# Patient Record
(demographics unavailable — no encounter records)

---

## 2025-03-03 NOTE — CONSULT LETTER
[Dear  ___] : Dear  [unfilled], [Consult Letter:] : I had the pleasure of evaluating your patient, [unfilled]. [Please see my note below.] : Please see my note below. [Consult Closing:] : Thank you very much for allowing me to participate in the care of this patient.  If you have any questions, please do not hesitate to contact me. [Sincerely,] : Sincerely, [FreeTextEntry3] : Jaime [DrCitlaly  ___] : Dr. MARINA [DrCitlaly ___] : Dr. MARINA

## 2025-03-03 NOTE — REVIEW OF SYSTEMS
[Nocturia] : nocturia [Negative] : Endocrine [FreeTextEntry2] : lost weight during the stomach virus.  Weight is stable currently.  [FreeTextEntry3] : diabetic retinopathy and macular edema (sees Dr. Miguel Molina) [FreeTextEntry8] : x 1-2 [FreeTextEntry9] : joint and muscle pain at times "just old age kind of stuff" [de-identified] : benign positional vertigo "it comes and goes" [de-identified] : anemia

## 2025-03-03 NOTE — ASSESSMENT
[FreeTextEntry1] : ! ! Mr. 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 He was admitted to [facility] for a small bowel obstruction which resolved with conservative care. This never recurred. Norm seems to be doing well. His recent BUN/creatinine trend is as follows: 32/1.53 (11/07/2019), 27/1.43 (06/25/2020), 23/1.37 (03/02/2021), 25/1.42 (08/31/2021), 25/1.60 (12/08/2021), 29/1.57 (11/30/2023) --> 26/1.62 (04/11/2024), 31/1.76 (04/17/2024), 27/1.79 (04/25/2024) -->24/1.65 (2/11/2025). The BK virus was not detected.  The tacrolimus level is 5.5 mcg/L. The MMF has been changed to Myfortic.   IMPRESSION:  (1) Renal transplant. The serum creatinine is stable.   (2) Secondary hyperparathyroidism. I have no recent values.  (3) Diabetes mellitus. Mr. Macario reports his most recent HbA1c was 6.9%.  Well controlled.   (4) BK virus. See above.  (5) Hypertension. Blood pressure is well controlled.  (6) Albuminuria. Currently taking losartan.  RECOMMENDATIONS:  (1) Continue the current medication regimen. (2) Mr. Macario will repeat the lab studies in 3 months.  (3) Stay well hydrated. (4) Continue the current diabetic regimen. I will check a HbA1c in 3 months.  (5) Continue the current immunosuppressive regimen.  Will obtain the results of the renal biopsy from [facility].

## 2025-03-03 NOTE — HISTORY OF PRESENT ILLNESS
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 He had an AV fistula placed by Dr. Rodrigo Del Valle on 02/09/2017. He received hemodialysis for 5 months.  Norm underwent a living unrelated renal transplant (wife) on 02/28/2018. The post operative course was complicated by a renal artery requiring stenting, ureteral narrowing s/p angioplasty on a number of occasions followed by a ureter transplant from the native kidney to the transplanted kidney. Mr. Macario is not aware of any post-transplant episodes of rejection. Mr. Macario has been followed by Dr. Renetta Peña who he last saw in February 2025.    I am seeing Norm in person today.  He feels well.  He had a stomach virus and the MMF was placed on hold.  Ultimately, it was restarted as Myfortic.  He has been doing well, tolerating Myfortic.

## 2025-03-03 NOTE — PHYSICAL EXAM
[General Appearance - Alert] : alert [General Appearance - In No Acute Distress] : in no acute distress [Sclera] : the sclera and conjunctiva were normal [Extraocular Movements] : extraocular movements were intact [Neck Appearance] : the appearance of the neck was normal [] : no respiratory distress [Respiration, Rhythm And Depth] : normal respiratory rhythm and effort [Exaggerated Use Of Accessory Muscles For Inspiration] : no accessory muscle use [Auscultation Breath Sounds / Voice Sounds] : lungs were clear to auscultation bilaterally [Heart Rate And Rhythm] : heart rate was normal and rhythm regular [Heart Sounds] : normal S1 and S2 [Heart Sounds Gallop] : no gallops [Heart Sounds Pericardial Friction Rub] : no pericardial rub [FreeTextEntry1] : MARVIN/DM, HSM [Edema] : there was no peripheral edema [Bowel Sounds] : normal bowel sounds [Abdomen Soft] : soft [Abdomen Tenderness] : non-tender [Abnormal Walk] : normal gait [Involuntary Movements] : no involuntary movements were seen [Skin Color & Pigmentation] : normal skin color and pigmentation [Skin Turgor] : normal skin turgor [No Focal Deficits] : no focal deficits [Oriented To Time, Place, And Person] : oriented to person, place, and time [Impaired Insight] : insight and judgment were intact [Affect] : the affect was normal [Mood] : the mood was normal [Over the Past 2 Weeks, Have You Felt Down, Depressed, or Hopeless?] : 1.) Over the past 2 weeks, have you felt down, depressed, or hopeless? No [Over the Past 2 Weeks, Have You Felt Little Interest or Pleasure Doing Things?] : 2.) Over the past 2 weeks, have you felt little interest or pleasure doing things? No